# Patient Record
Sex: MALE | URBAN - METROPOLITAN AREA
[De-identification: names, ages, dates, MRNs, and addresses within clinical notes are randomized per-mention and may not be internally consistent; named-entity substitution may affect disease eponyms.]

---

## 2017-09-02 ENCOUNTER — INPATIENT (INPATIENT)
Facility: HOSPITAL | Age: 62
LOS: 2 days | Discharge: HOME | End: 2017-09-05
Attending: SURGERY | Admitting: SURGERY
Payer: COMMERCIAL

## 2017-09-02 ENCOUNTER — TRANSCRIPTION ENCOUNTER (OUTPATIENT)
Age: 62
End: 2017-09-02

## 2017-09-02 ENCOUNTER — RESULT REVIEW (OUTPATIENT)
Age: 62
End: 2017-09-02

## 2017-09-02 VITALS
DIASTOLIC BLOOD PRESSURE: 91 MMHG | TEMPERATURE: 98 F | RESPIRATION RATE: 18 BRPM | HEART RATE: 94 BPM | OXYGEN SATURATION: 100 % | SYSTOLIC BLOOD PRESSURE: 146 MMHG

## 2017-09-02 DIAGNOSIS — K35.80 UNSPECIFIED ACUTE APPENDICITIS: ICD-10-CM

## 2017-09-02 DIAGNOSIS — K37 UNSPECIFIED APPENDICITIS: ICD-10-CM

## 2017-09-02 LAB
ALBUMIN SERPL ELPH-MCNC: 4.1 G/DL — SIGNIFICANT CHANGE UP (ref 3.3–5)
ALP SERPL-CCNC: 83 U/L — SIGNIFICANT CHANGE UP (ref 40–120)
ALT FLD-CCNC: 13 U/L — SIGNIFICANT CHANGE UP (ref 4–41)
APPEARANCE UR: CLEAR — SIGNIFICANT CHANGE UP
APTT BLD: 25.9 SEC — LOW (ref 27.5–37.4)
AST SERPL-CCNC: 15 U/L — SIGNIFICANT CHANGE UP (ref 4–40)
BACTERIA # UR AUTO: SIGNIFICANT CHANGE UP
BASE EXCESS BLDV CALC-SCNC: 2.6 MMOL/L — SIGNIFICANT CHANGE UP
BASOPHILS # BLD AUTO: 0.07 K/UL — SIGNIFICANT CHANGE UP (ref 0–0.2)
BASOPHILS NFR BLD AUTO: 0.4 % — SIGNIFICANT CHANGE UP (ref 0–2)
BILIRUB SERPL-MCNC: 0.4 MG/DL — SIGNIFICANT CHANGE UP (ref 0.2–1.2)
BILIRUB UR-MCNC: NEGATIVE — SIGNIFICANT CHANGE UP
BLD GP AB SCN SERPL QL: NEGATIVE — SIGNIFICANT CHANGE UP
BLOOD GAS VENOUS - CREATININE: 1.04 MG/DL — SIGNIFICANT CHANGE UP (ref 0.5–1.3)
BLOOD UR QL VISUAL: HIGH
BUN SERPL-MCNC: 12 MG/DL — SIGNIFICANT CHANGE UP (ref 7–23)
CALCIUM SERPL-MCNC: 9.6 MG/DL — SIGNIFICANT CHANGE UP (ref 8.4–10.5)
CHLORIDE BLDV-SCNC: 107 MMOL/L — SIGNIFICANT CHANGE UP (ref 96–108)
CHLORIDE SERPL-SCNC: 102 MMOL/L — SIGNIFICANT CHANGE UP (ref 98–107)
CO2 SERPL-SCNC: 26 MMOL/L — SIGNIFICANT CHANGE UP (ref 22–31)
COLOR SPEC: YELLOW — SIGNIFICANT CHANGE UP
CREAT SERPL-MCNC: 1.05 MG/DL — SIGNIFICANT CHANGE UP (ref 0.5–1.3)
EOSINOPHIL # BLD AUTO: 0.18 K/UL — SIGNIFICANT CHANGE UP (ref 0–0.5)
EOSINOPHIL NFR BLD AUTO: 1 % — SIGNIFICANT CHANGE UP (ref 0–6)
GAS PNL BLDV: 137 MMOL/L — SIGNIFICANT CHANGE UP (ref 136–146)
GLUCOSE BLDV-MCNC: 180 — HIGH (ref 70–99)
GLUCOSE SERPL-MCNC: 171 MG/DL — HIGH (ref 70–99)
GLUCOSE UR-MCNC: 250 — SIGNIFICANT CHANGE UP
HBA1C BLD-MCNC: 6.9 % — HIGH (ref 4–5.6)
HCO3 BLDV-SCNC: 24 MMOL/L — SIGNIFICANT CHANGE UP (ref 20–27)
HCT VFR BLD CALC: 44.9 % — SIGNIFICANT CHANGE UP (ref 39–50)
HCT VFR BLDV CALC: 48.2 % — SIGNIFICANT CHANGE UP (ref 39–51)
HGB BLD-MCNC: 15.1 G/DL — SIGNIFICANT CHANGE UP (ref 13–17)
HGB BLDV-MCNC: 15.7 G/DL — SIGNIFICANT CHANGE UP (ref 13–17)
HYALINE CASTS # UR AUTO: SIGNIFICANT CHANGE UP (ref 0–?)
IMM GRANULOCYTES # BLD AUTO: 0.09 # — SIGNIFICANT CHANGE UP
IMM GRANULOCYTES NFR BLD AUTO: 0.5 % — SIGNIFICANT CHANGE UP (ref 0–1.5)
INR BLD: 0.91 — SIGNIFICANT CHANGE UP (ref 0.88–1.17)
KETONES UR-MCNC: NEGATIVE — SIGNIFICANT CHANGE UP
LACTATE BLDV-MCNC: 2.9 MMOL/L — HIGH (ref 0.5–2)
LEUKOCYTE ESTERASE UR-ACNC: NEGATIVE — SIGNIFICANT CHANGE UP
LIDOCAIN IGE QN: 63.3 U/L — HIGH (ref 7–60)
LYMPHOCYTES # BLD AUTO: 26.3 % — SIGNIFICANT CHANGE UP (ref 13–44)
LYMPHOCYTES # BLD AUTO: 4.55 K/UL — HIGH (ref 1–3.3)
MCHC RBC-ENTMCNC: 32.3 PG — SIGNIFICANT CHANGE UP (ref 27–34)
MCHC RBC-ENTMCNC: 33.6 % — SIGNIFICANT CHANGE UP (ref 32–36)
MCV RBC AUTO: 96.1 FL — SIGNIFICANT CHANGE UP (ref 80–100)
MONOCYTES # BLD AUTO: 0.79 K/UL — SIGNIFICANT CHANGE UP (ref 0–0.9)
MONOCYTES NFR BLD AUTO: 4.6 % — SIGNIFICANT CHANGE UP (ref 2–14)
MUCOUS THREADS # UR AUTO: SIGNIFICANT CHANGE UP
NEUTROPHILS # BLD AUTO: 11.64 K/UL — HIGH (ref 1.8–7.4)
NEUTROPHILS NFR BLD AUTO: 67.2 % — SIGNIFICANT CHANGE UP (ref 43–77)
NITRITE UR-MCNC: NEGATIVE — SIGNIFICANT CHANGE UP
NRBC # FLD: 0 — SIGNIFICANT CHANGE UP
PCO2 BLDV: 56 MMHG — HIGH (ref 41–51)
PH BLDV: 7.32 PH — SIGNIFICANT CHANGE UP (ref 7.32–7.43)
PH UR: 6 — SIGNIFICANT CHANGE UP (ref 4.6–8)
PLATELET # BLD AUTO: 256 K/UL — SIGNIFICANT CHANGE UP (ref 150–400)
PMV BLD: 9.9 FL — SIGNIFICANT CHANGE UP (ref 7–13)
PO2 BLDV: 31 MMHG — LOW (ref 35–40)
POTASSIUM BLDV-SCNC: 3.4 MMOL/L — SIGNIFICANT CHANGE UP (ref 3.4–4.5)
POTASSIUM SERPL-MCNC: 3.6 MMOL/L — SIGNIFICANT CHANGE UP (ref 3.5–5.3)
POTASSIUM SERPL-SCNC: 3.6 MMOL/L — SIGNIFICANT CHANGE UP (ref 3.5–5.3)
PROT SERPL-MCNC: 7 G/DL — SIGNIFICANT CHANGE UP (ref 6–8.3)
PROT UR-MCNC: 10 — SIGNIFICANT CHANGE UP
PROTHROM AB SERPL-ACNC: 10.2 SEC — SIGNIFICANT CHANGE UP (ref 9.8–13.1)
RBC # BLD: 4.67 M/UL — SIGNIFICANT CHANGE UP (ref 4.2–5.8)
RBC # FLD: 12.7 % — SIGNIFICANT CHANGE UP (ref 10.3–14.5)
RBC CASTS # UR COMP ASSIST: HIGH (ref 0–?)
RH IG SCN BLD-IMP: POSITIVE — SIGNIFICANT CHANGE UP
SAO2 % BLDV: 48.7 % — LOW (ref 60–85)
SODIUM SERPL-SCNC: 141 MMOL/L — SIGNIFICANT CHANGE UP (ref 135–145)
SP GR SPEC: 1.02 — SIGNIFICANT CHANGE UP (ref 1–1.03)
SQUAMOUS # UR AUTO: SIGNIFICANT CHANGE UP
UROBILINOGEN FLD QL: NORMAL E.U. — SIGNIFICANT CHANGE UP (ref 0.1–0.2)
WBC # BLD: 17.32 K/UL — HIGH (ref 3.8–10.5)
WBC # FLD AUTO: 17.32 K/UL — HIGH (ref 3.8–10.5)
WBC UR QL: SIGNIFICANT CHANGE UP (ref 0–?)

## 2017-09-02 PROCEDURE — 44970 LAPAROSCOPY APPENDECTOMY: CPT | Mod: GC

## 2017-09-02 PROCEDURE — 88304 TISSUE EXAM BY PATHOLOGIST: CPT | Mod: 26

## 2017-09-02 RX ORDER — KETOROLAC TROMETHAMINE 30 MG/ML
30 SYRINGE (ML) INJECTION EVERY 6 HOURS
Qty: 0 | Refills: 0 | Status: DISCONTINUED | OUTPATIENT
Start: 2017-09-02 | End: 2017-09-05

## 2017-09-02 RX ORDER — SODIUM CHLORIDE 9 MG/ML
1000 INJECTION INTRAMUSCULAR; INTRAVENOUS; SUBCUTANEOUS ONCE
Qty: 0 | Refills: 0 | Status: COMPLETED | OUTPATIENT
Start: 2017-09-02 | End: 2017-09-02

## 2017-09-02 RX ORDER — HYDROMORPHONE HYDROCHLORIDE 2 MG/ML
0.25 INJECTION INTRAMUSCULAR; INTRAVENOUS; SUBCUTANEOUS
Qty: 0 | Refills: 0 | Status: DISCONTINUED | OUTPATIENT
Start: 2017-09-02 | End: 2017-09-02

## 2017-09-02 RX ORDER — PIPERACILLIN AND TAZOBACTAM 4; .5 G/20ML; G/20ML
3.38 INJECTION, POWDER, LYOPHILIZED, FOR SOLUTION INTRAVENOUS ONCE
Qty: 0 | Refills: 0 | Status: COMPLETED | OUTPATIENT
Start: 2017-09-02 | End: 2017-09-02

## 2017-09-02 RX ORDER — HYDROMORPHONE HYDROCHLORIDE 2 MG/ML
1 INJECTION INTRAMUSCULAR; INTRAVENOUS; SUBCUTANEOUS ONCE
Qty: 0 | Refills: 0 | Status: DISCONTINUED | OUTPATIENT
Start: 2017-09-02 | End: 2017-09-02

## 2017-09-02 RX ORDER — PIPERACILLIN AND TAZOBACTAM 4; .5 G/20ML; G/20ML
3.38 INJECTION, POWDER, LYOPHILIZED, FOR SOLUTION INTRAVENOUS EVERY 8 HOURS
Qty: 0 | Refills: 0 | Status: DISCONTINUED | OUTPATIENT
Start: 2017-09-02 | End: 2017-09-04

## 2017-09-02 RX ORDER — ACETAMINOPHEN 500 MG
1000 TABLET ORAL ONCE
Qty: 0 | Refills: 0 | Status: COMPLETED | OUTPATIENT
Start: 2017-09-02 | End: 2017-09-03

## 2017-09-02 RX ORDER — KETOROLAC TROMETHAMINE 30 MG/ML
30 SYRINGE (ML) INJECTION ONCE
Qty: 0 | Refills: 0 | Status: DISCONTINUED | OUTPATIENT
Start: 2017-09-02 | End: 2017-09-02

## 2017-09-02 RX ORDER — HYDROMORPHONE HYDROCHLORIDE 2 MG/ML
0.5 INJECTION INTRAMUSCULAR; INTRAVENOUS; SUBCUTANEOUS
Qty: 0 | Refills: 0 | Status: DISCONTINUED | OUTPATIENT
Start: 2017-09-02 | End: 2017-09-02

## 2017-09-02 RX ORDER — HEPARIN SODIUM 5000 [USP'U]/ML
5000 INJECTION INTRAVENOUS; SUBCUTANEOUS EVERY 8 HOURS
Qty: 0 | Refills: 0 | Status: DISCONTINUED | OUTPATIENT
Start: 2017-09-02 | End: 2017-09-05

## 2017-09-02 RX ORDER — OXYCODONE HYDROCHLORIDE 5 MG/1
5 TABLET ORAL EVERY 6 HOURS
Qty: 0 | Refills: 0 | Status: DISCONTINUED | OUTPATIENT
Start: 2017-09-02 | End: 2017-09-02

## 2017-09-02 RX ORDER — ACETAMINOPHEN 500 MG
1000 TABLET ORAL ONCE
Qty: 0 | Refills: 0 | Status: COMPLETED | OUTPATIENT
Start: 2017-09-02 | End: 2017-09-02

## 2017-09-02 RX ORDER — NICOTINE POLACRILEX 2 MG
1 GUM BUCCAL DAILY
Qty: 0 | Refills: 0 | Status: DISCONTINUED | OUTPATIENT
Start: 2017-09-02 | End: 2017-09-03

## 2017-09-02 RX ORDER — SODIUM CHLORIDE 9 MG/ML
1000 INJECTION, SOLUTION INTRAVENOUS
Qty: 0 | Refills: 0 | Status: DISCONTINUED | OUTPATIENT
Start: 2017-09-02 | End: 2017-09-04

## 2017-09-02 RX ORDER — ONDANSETRON 8 MG/1
4 TABLET, FILM COATED ORAL ONCE
Qty: 0 | Refills: 0 | Status: DISCONTINUED | OUTPATIENT
Start: 2017-09-02 | End: 2017-09-02

## 2017-09-02 RX ORDER — OXYCODONE HYDROCHLORIDE 5 MG/1
2.5 TABLET ORAL ONCE
Qty: 0 | Refills: 0 | Status: DISCONTINUED | OUTPATIENT
Start: 2017-09-02 | End: 2017-09-02

## 2017-09-02 RX ADMIN — HEPARIN SODIUM 5000 UNIT(S): 5000 INJECTION INTRAVENOUS; SUBCUTANEOUS at 06:44

## 2017-09-02 RX ADMIN — SODIUM CHLORIDE 125 MILLILITER(S): 9 INJECTION, SOLUTION INTRAVENOUS at 23:12

## 2017-09-02 RX ADMIN — SODIUM CHLORIDE 125 MILLILITER(S): 9 INJECTION, SOLUTION INTRAVENOUS at 11:00

## 2017-09-02 RX ADMIN — HYDROMORPHONE HYDROCHLORIDE 0.5 MILLIGRAM(S): 2 INJECTION INTRAMUSCULAR; INTRAVENOUS; SUBCUTANEOUS at 11:45

## 2017-09-02 RX ADMIN — Medication 400 MILLIGRAM(S): at 02:52

## 2017-09-02 RX ADMIN — Medication 1000 MILLIGRAM(S): at 03:11

## 2017-09-02 RX ADMIN — HEPARIN SODIUM 5000 UNIT(S): 5000 INJECTION INTRAVENOUS; SUBCUTANEOUS at 13:40

## 2017-09-02 RX ADMIN — HEPARIN SODIUM 5000 UNIT(S): 5000 INJECTION INTRAVENOUS; SUBCUTANEOUS at 23:14

## 2017-09-02 RX ADMIN — PIPERACILLIN AND TAZOBACTAM 200 GRAM(S): 4; .5 INJECTION, POWDER, LYOPHILIZED, FOR SOLUTION INTRAVENOUS at 06:22

## 2017-09-02 RX ADMIN — HYDROMORPHONE HYDROCHLORIDE 1 MILLIGRAM(S): 2 INJECTION INTRAMUSCULAR; INTRAVENOUS; SUBCUTANEOUS at 02:26

## 2017-09-02 RX ADMIN — SODIUM CHLORIDE 125 MILLILITER(S): 9 INJECTION, SOLUTION INTRAVENOUS at 06:46

## 2017-09-02 RX ADMIN — HYDROMORPHONE HYDROCHLORIDE 0.5 MILLIGRAM(S): 2 INJECTION INTRAMUSCULAR; INTRAVENOUS; SUBCUTANEOUS at 16:15

## 2017-09-02 RX ADMIN — SODIUM CHLORIDE 1000 MILLILITER(S): 9 INJECTION INTRAMUSCULAR; INTRAVENOUS; SUBCUTANEOUS at 02:52

## 2017-09-02 RX ADMIN — HYDROMORPHONE HYDROCHLORIDE 0.5 MILLIGRAM(S): 2 INJECTION INTRAMUSCULAR; INTRAVENOUS; SUBCUTANEOUS at 11:32

## 2017-09-02 RX ADMIN — HYDROMORPHONE HYDROCHLORIDE 1 MILLIGRAM(S): 2 INJECTION INTRAMUSCULAR; INTRAVENOUS; SUBCUTANEOUS at 02:30

## 2017-09-02 RX ADMIN — PIPERACILLIN AND TAZOBACTAM 25 GRAM(S): 4; .5 INJECTION, POWDER, LYOPHILIZED, FOR SOLUTION INTRAVENOUS at 23:10

## 2017-09-02 RX ADMIN — Medication 30 MILLIGRAM(S): at 05:17

## 2017-09-02 RX ADMIN — PIPERACILLIN AND TAZOBACTAM 25 GRAM(S): 4; .5 INJECTION, POWDER, LYOPHILIZED, FOR SOLUTION INTRAVENOUS at 13:40

## 2017-09-02 RX ADMIN — Medication 30 MILLIGRAM(S): at 19:04

## 2017-09-02 RX ADMIN — Medication 30 MILLIGRAM(S): at 06:22

## 2017-09-02 RX ADMIN — Medication 30 MILLIGRAM(S): at 18:14

## 2017-09-02 RX ADMIN — HYDROMORPHONE HYDROCHLORIDE 0.5 MILLIGRAM(S): 2 INJECTION INTRAMUSCULAR; INTRAVENOUS; SUBCUTANEOUS at 16:05

## 2017-09-02 RX ADMIN — SODIUM CHLORIDE 1000 MILLILITER(S): 9 INJECTION INTRAMUSCULAR; INTRAVENOUS; SUBCUTANEOUS at 05:06

## 2017-09-02 NOTE — ED ADULT NURSE NOTE - CHPI ED SYMPTOMS NEG
no hematuria/no abdominal distension/no diarrhea/no dysuria/no blood in stool/no chills/no burning urination/no fever/no vomiting/no nausea

## 2017-09-02 NOTE — ED ADULT NURSE REASSESSMENT NOTE - NS ED NURSE REASSESS COMMENT FT1
pt refused to have belongings itemized and sent to security, when pt went to the OR it was explained to the pt that his belongings need to be locked up with security, and can not go to the OR,  when PCA brought belongings to security, Security refused to take pts belongings stating they were not in the proper bags. Belongings itemized by RN and PCA Jeannette. No wallet or passport was found when itemizing items. IPAD, Lap top, car keys and cellphone are with briefcase and suit case. See itemized list.   Attempted to call daughter at number given for emergency contact, number busy. Items kept at nursing station.

## 2017-09-02 NOTE — ED ADULT TRIAGE NOTE - CHIEF COMPLAINT QUOTE
Pt. brought in by EMS c/o bilateral lower abdominal pain x 6 hours. Reports he was about to board a flight to [place] but that pain began and has progressively worsened, EMS called and patient brought to [facility] from [facility] airport. Also reports urinary retention since 7pm. Denies n/v/d/c, fevers or chills. Appears uncomfortable.

## 2017-09-02 NOTE — ED PROVIDER NOTE - ATTENDING CONTRIBUTION TO CARE
MD Benedict:  patient seen and evaluated with the resident.  I was present for key portions of the History & Physical, and I agree with the Impression & Plan.  MD Benedict:  63 yo M, c/o severe abd pain.  Intensity:  10/10.  Quality:  sharp/stabbing.  Location: R-sided.  Better/worse - n/a.  Duration:  intermittent 2d.  Context:  Was boarding a flight to [place] when his pain intensified to the point where he sought medical attention.  On exam he is colicky-appearing, +flank pain, +abd pain.  Impression:  renal colic vs bowel pathology.  XRay= no free air.  Ua - unable to make urine.  Plan:  CT abd w/o contrast, pain meds, IVF.

## 2017-09-02 NOTE — H&P ADULT - ATTENDING COMMENTS
I have personally interviewed and examined this patient, reviewed pertinent labs and imaging, and discussed the case with residents on B Team rounds.    The active care issues are:  1. acute appendicitis requiring urgent surgical intervention given severity of infection    We are aware of the patient's recent cocaine use and will use appropriate precautions for cardiac events.  Unfortunately, the severity of infection prohibits waiting.  The patient, anesthesiologist are aware and agree.

## 2017-09-02 NOTE — ED PROVIDER NOTE - OBJECTIVE STATEMENT
63yo male pmh pre-DM, HTN p/w bilateral lower abdominal pain radiating to groin, intermittent now constant x2d. Also c/o bilateral lower back pain. Unable to urinate x 6h. Denies f/c/n/v/d, chest pain, dyspnea, trauma. Sister with nephrolithiasis. Pt has idiopathic hematuria.

## 2017-09-02 NOTE — ED ADULT NURSE NOTE - CHIEF COMPLAINT QUOTE
Pt. brought in by EMS c/o bilateral lower abdominal pain x 6 hours. Reports he was about to board a flight to [place] but that pain began and has progressively worsened, EMS called and patient brought to [facility] from [facility] airport. Also reports urinary retention since 7pm. Denies n/v/d/c, fevers or chills. Appears uncomfortable.

## 2017-09-02 NOTE — H&P ADULT - NSHPLABSRESULTS_GEN_ALL_CORE
Labs:                        15.1   17.32 )-----------( 256      ( 02 Sep 2017 02:30 )             44.9     09-02    141  |  102  |  12  ----------------------------<  171<H>  3.6   |  26  |  1.05    Ca    9.6      02 Sep 2017 02:30    TPro  7.0  /  Alb  4.1  /  TBili  0.4  /  DBili  x   /  AST  15  /  ALT  13  /  AlkPhos  83  09-02    PT/INR - ( 02 Sep 2017 02:30 )   PT: 10.2 SEC;   INR: 0.91          PTT - ( 02 Sep 2017 02:30 )  PTT:25.9 SEC      Blood Gas Venous - Lactate: 2.9: Please note updated reference range. mmol/L (09.02.17 @ 03:03)      Imaging  < from: CT Abdomen and Pelvis No Cont (09.02.17 @ 04:52) >    FINDINGS: Evaluation of the heart, vascular structures, and   intra-abdominal organs is limited without the administration of IV   contrast. The heart size is mildly enlarged. Aortic valvular   calcifications are noted. There is mild atherosclerotic disease of the   descending aorta and branch arterial vessels. The imaged portions of the   aorta are normal in caliber.    Scattered areas of dependent atelectasis are notable within the bilateral   lung bases.    There is a rounded hypodense focus within the right medial hepatic lobe   which is too small to accurately characterize. The gallbladder, biliary   tree, spleen, pancreas, and adrenal glands appear unremarkable on this   noncontrast examination.    There is nonspecific bilateral perinephric stranding. There is no   hydroureteronephrosis bilaterally. No radiopaque obstructing stones are   noted within the course of the bilateral ureters. The urinary bladder   appears unremarkable.    There are multiple scattered nonspecific subcentimeter retroperitoneal   and mesenteric lymph nodes.    There is no bowel obstruction. Gas and stool are notable throughout the   large bowel loops. Colonic diverticulosis is notable. There is wall   thickening involving the appendix, most pronounced at the base.   Intraluminal appendicoliths are noted. There is surrounding fat stranding.    There is a small amount of ascites.    Calcifications are notable within the prostate gland.    The visualized osseous structures are unremarkable.     IMPRESSION: Findings suspicious for appendicitis.    Small amount of abdominal ascites.    No obstructive urolithiasis.    Dr. Mario Luna discussed findings with Dr. Hoff on 9/2/2017 at 5:26 AM    < end of copied text >

## 2017-09-02 NOTE — ED ADULT NURSE NOTE - OBJECTIVE STATEMENT
Pt 62y male, aaox4 and ambulatory, presents to ED c/o severe abd pain. Pt was at [place] airport ready to board plane to [place], when he started to have severe abd pain. As per pt he had a normal BM earlier today, but has been unable to pee since 7pm. Pt state pain started higher in abd but now pain appears to be lower in the abd. Pt appears uncomfortable, unable to lay still. Daughter at bedside. Pt denies any significant pmh. Pt denies n/v/d, ha, fever, chills, cp, sob. IV placed 20G left AC, labs drawn and sent, will continue to monitor.

## 2017-09-02 NOTE — H&P ADULT - NSHPPHYSICALEXAM_GEN_ALL_CORE
T(C): 36.6 (09-02-17 @ 01:46), Max: 36.6 (09-02-17 @ 01:46)  HR: 104 (09-02-17 @ 03:35) (94 - 104)  BP: 126/90 (09-02-17 @ 03:35) (126/90 - 146/91)  RR: 14 (09-02-17 @ 03:35) (14 - 18)  SpO2: 100% (09-02-17 @ 03:35) (100% - 100%)  Tmax: T(C): , Max: 36.6 (09-02-17 @ 01:46)    Gen: NAD  HEENT: normocephalic, atraumatic, no scleral icterus  CV: S1, S2, RRR  Pulm: CTA B/L  Abd: soft, mildly distended, diffusely tender especially RLQ, +guarding/rebound RLQ and LUQ  Ext: warm, no edema, palp dp/pt

## 2017-09-02 NOTE — H&P ADULT - HISTORY OF PRESENT ILLNESS
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 He reports some abdominal discomfort/bloating over the last 2 days, and acute onset of generalized pain at midnight 9/2/17 while preparing to board a flight to [place] from [facility]. He was brought to [facility] by ambulance. He denies associated fevers/chills, nausea/vomiting, dysuria. He has been having flatus and normal bowel movements without melena or hematochezia. Last colonoscopy in 2013 was reportedly normal. He had a stress test in 2010 for angina that reportedly showed some coronary lesions but did not required any revascularization procedures.    We are consulted by the ED for concerns of generalized peritonitis on exam and CT findings c/w appendicitis.    He received IV Zosyn x 1 in the ED, 2L NS, IV toradol, IV dilaudid and IV tylenol.

## 2017-09-02 NOTE — ED PROVIDER NOTE - PROGRESS NOTE DETAILS
No renal stone on CT; but is concerning for appendicitis.  abd pain more diffusely peritoneal.  Impression:  Acute appendicitis.  Plan:  abx, ivf, pre-ops, admit.

## 2017-09-02 NOTE — ED PROVIDER NOTE - MEDICAL DECISION MAKING DETAILS
61yo male p/w severe lower abdominal pain. STAT portable xray shows no free air. Concern for nephrolithiasis. Pain control, labs, CT abd/pelvis

## 2017-09-02 NOTE — H&P ADULT - NSHPSOCIALHISTORY_GEN_ALL_CORE
, lives alone, has 3 grown daughters. 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 Current smoker, 40 pack year hx. Social ETOH use. , lives alone, has 3 grown daughters. 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 Current smoker, 40 pack year hx. Social ETOH use. +cocaine use (last use yesterday)

## 2017-09-03 LAB
BACTERIA UR CULT: SIGNIFICANT CHANGE UP
BUN SERPL-MCNC: 12 MG/DL — SIGNIFICANT CHANGE UP (ref 7–23)
CALCIUM SERPL-MCNC: 9 MG/DL — SIGNIFICANT CHANGE UP (ref 8.4–10.5)
CHLORIDE SERPL-SCNC: 104 MMOL/L — SIGNIFICANT CHANGE UP (ref 98–107)
CO2 SERPL-SCNC: 22 MMOL/L — SIGNIFICANT CHANGE UP (ref 22–31)
CREAT SERPL-MCNC: 0.83 MG/DL — SIGNIFICANT CHANGE UP (ref 0.5–1.3)
GLUCOSE SERPL-MCNC: 143 MG/DL — HIGH (ref 70–99)
HCT VFR BLD CALC: 37.4 % — LOW (ref 39–50)
HGB BLD-MCNC: 12.7 G/DL — LOW (ref 13–17)
MCHC RBC-ENTMCNC: 32.2 PG — SIGNIFICANT CHANGE UP (ref 27–34)
MCHC RBC-ENTMCNC: 34 % — SIGNIFICANT CHANGE UP (ref 32–36)
MCV RBC AUTO: 94.9 FL — SIGNIFICANT CHANGE UP (ref 80–100)
NRBC # FLD: 0 — SIGNIFICANT CHANGE UP
PLATELET # BLD AUTO: 199 K/UL — SIGNIFICANT CHANGE UP (ref 150–400)
PMV BLD: 10 FL — SIGNIFICANT CHANGE UP (ref 7–13)
POTASSIUM SERPL-MCNC: 3.7 MMOL/L — SIGNIFICANT CHANGE UP (ref 3.5–5.3)
POTASSIUM SERPL-SCNC: 3.7 MMOL/L — SIGNIFICANT CHANGE UP (ref 3.5–5.3)
RBC # BLD: 3.94 M/UL — LOW (ref 4.2–5.8)
RBC # FLD: 12.8 % — SIGNIFICANT CHANGE UP (ref 10.3–14.5)
SODIUM SERPL-SCNC: 141 MMOL/L — SIGNIFICANT CHANGE UP (ref 135–145)
SPECIMEN SOURCE: SIGNIFICANT CHANGE UP
WBC # BLD: 15.1 K/UL — HIGH (ref 3.8–10.5)
WBC # FLD AUTO: 15.1 K/UL — HIGH (ref 3.8–10.5)

## 2017-09-03 RX ORDER — ACETAMINOPHEN 500 MG
1000 TABLET ORAL ONCE
Qty: 0 | Refills: 0 | Status: DISCONTINUED | OUTPATIENT
Start: 2017-09-03 | End: 2017-09-04

## 2017-09-03 RX ORDER — MORPHINE SULFATE 50 MG/1
2 CAPSULE, EXTENDED RELEASE ORAL ONCE
Qty: 0 | Refills: 0 | Status: DISCONTINUED | OUTPATIENT
Start: 2017-09-03 | End: 2017-09-03

## 2017-09-03 RX ORDER — ACETAMINOPHEN 500 MG
650 TABLET ORAL EVERY 6 HOURS
Qty: 0 | Refills: 0 | Status: DISCONTINUED | OUTPATIENT
Start: 2017-09-03 | End: 2017-09-03

## 2017-09-03 RX ORDER — ZOLPIDEM TARTRATE 10 MG/1
5 TABLET ORAL AT BEDTIME
Qty: 0 | Refills: 0 | Status: DISCONTINUED | OUTPATIENT
Start: 2017-09-03 | End: 2017-09-04

## 2017-09-03 RX ORDER — ACETAMINOPHEN 500 MG
1000 TABLET ORAL ONCE
Qty: 0 | Refills: 0 | Status: DISCONTINUED | OUTPATIENT
Start: 2017-09-03 | End: 2017-09-03

## 2017-09-03 RX ORDER — MORPHINE SULFATE 50 MG/1
2 CAPSULE, EXTENDED RELEASE ORAL EVERY 4 HOURS
Qty: 0 | Refills: 0 | Status: DISCONTINUED | OUTPATIENT
Start: 2017-09-03 | End: 2017-09-05

## 2017-09-03 RX ORDER — NICOTINE POLACRILEX 2 MG
1 GUM BUCCAL DAILY
Qty: 0 | Refills: 0 | Status: DISCONTINUED | OUTPATIENT
Start: 2017-09-03 | End: 2017-09-05

## 2017-09-03 RX ADMIN — Medication 30 MILLIGRAM(S): at 00:33

## 2017-09-03 RX ADMIN — MORPHINE SULFATE 2 MILLIGRAM(S): 50 CAPSULE, EXTENDED RELEASE ORAL at 18:13

## 2017-09-03 RX ADMIN — Medication 1000 MILLIGRAM(S): at 09:03

## 2017-09-03 RX ADMIN — PIPERACILLIN AND TAZOBACTAM 25 GRAM(S): 4; .5 INJECTION, POWDER, LYOPHILIZED, FOR SOLUTION INTRAVENOUS at 13:49

## 2017-09-03 RX ADMIN — Medication 30 MILLIGRAM(S): at 17:07

## 2017-09-03 RX ADMIN — MORPHINE SULFATE 2 MILLIGRAM(S): 50 CAPSULE, EXTENDED RELEASE ORAL at 17:58

## 2017-09-03 RX ADMIN — Medication 30 MILLIGRAM(S): at 17:22

## 2017-09-03 RX ADMIN — ZOLPIDEM TARTRATE 5 MILLIGRAM(S): 10 TABLET ORAL at 23:33

## 2017-09-03 RX ADMIN — MORPHINE SULFATE 2 MILLIGRAM(S): 50 CAPSULE, EXTENDED RELEASE ORAL at 21:07

## 2017-09-03 RX ADMIN — Medication 1 PATCH: at 12:13

## 2017-09-03 RX ADMIN — Medication 30 MILLIGRAM(S): at 23:32

## 2017-09-03 RX ADMIN — Medication 30 MILLIGRAM(S): at 00:50

## 2017-09-03 RX ADMIN — MORPHINE SULFATE 2 MILLIGRAM(S): 50 CAPSULE, EXTENDED RELEASE ORAL at 21:40

## 2017-09-03 RX ADMIN — Medication 30 MILLIGRAM(S): at 12:21

## 2017-09-03 RX ADMIN — PIPERACILLIN AND TAZOBACTAM 25 GRAM(S): 4; .5 INJECTION, POWDER, LYOPHILIZED, FOR SOLUTION INTRAVENOUS at 06:40

## 2017-09-03 RX ADMIN — Medication 30 MILLIGRAM(S): at 06:55

## 2017-09-03 RX ADMIN — HEPARIN SODIUM 5000 UNIT(S): 5000 INJECTION INTRAVENOUS; SUBCUTANEOUS at 06:37

## 2017-09-03 RX ADMIN — PIPERACILLIN AND TAZOBACTAM 25 GRAM(S): 4; .5 INJECTION, POWDER, LYOPHILIZED, FOR SOLUTION INTRAVENOUS at 21:23

## 2017-09-03 RX ADMIN — SODIUM CHLORIDE 125 MILLILITER(S): 9 INJECTION, SOLUTION INTRAVENOUS at 08:38

## 2017-09-03 RX ADMIN — Medication 30 MILLIGRAM(S): at 06:36

## 2017-09-03 RX ADMIN — Medication 400 MILLIGRAM(S): at 08:48

## 2017-09-03 RX ADMIN — Medication 30 MILLIGRAM(S): at 12:06

## 2017-09-03 NOTE — PROGRESS NOTE ADULT - ASSESSMENT
62y M POD 0 s/p lap appendectomy for ruptured appendicitis, recovering well.  -AVSS  -Pain well controlled

## 2017-09-03 NOTE — PROGRESS NOTE ADULT - SUBJECTIVE AND OBJECTIVE BOX
Surgery B Team Post Op Check    Subjective:  Patient examined at bedside resting well, complains of mild tenderness to abdomen at surgical incision site but states he feels " a world of difference, better" post-op vs preop.  No complaints at this time.    Denies n/v, sob,     Objective:  Vital Signs Last 24 Hrs  T(C): 36.7 (03 Sep 2017 05:13), Max: 36.9 (02 Sep 2017 19:56)  T(F): 98.1 (03 Sep 2017 05:13), Max: 98.5 (02 Sep 2017 19:56)  HR: 90 (03 Sep 2017 05:13) (88 - 109)  BP: 112/71 (03 Sep 2017 05:13) (112/71 - 154/85)  BP(mean): --  RR: 18 (03 Sep 2017 05:13) (11 - 20)  SpO2: 97% (03 Sep 2017 05:13) (95% - 100%)    Labs:                        15.1   17.32 )-----------( 256      ( 02 Sep 2017 02:30 )             44.9       09-02    141  |  102  |  12  ----------------------------<  171<H>  3.6   |  26  |  1.05    Ca    9.6      02 Sep 2017 02:30    TPro  7.0  /  Alb  4.1  /  TBili  0.4  /  DBili  x   /  AST  15  /  ALT  13  /  AlkPhos  83  09-02      I&O's Detail    02 Sep 2017 07:01  -  03 Sep 2017 06:18  --------------------------------------------------------  IN:    lactated ringers.: 1375 mL  Total IN: 1375 mL    OUT:    Indwelling Catheter - Urethral: 2750 mL  Total OUT: 2750 mL    Total NET: -1375 mL    Focused Physical Exam:  General: NAD  Respiratory: Nonlabored breathing  Abdomen: Soft, tender to palpation diffusely (appropriate) due to incision sites, ND

## 2017-09-03 NOTE — PROGRESS NOTE ADULT - SUBJECTIVE AND OBJECTIVE BOX
SUBJECTIVE:  Doing well.   No overnight events.    OBJECTIVE:     ** VITAL SIGNS / I&O's **    Vital Signs Last 24 Hrs  T(C): 36.5 (03 Sep 2017 12:15), Max: 36.9 (02 Sep 2017 19:56)  T(F): 97.7 (03 Sep 2017 12:15), Max: 98.5 (02 Sep 2017 19:56)  HR: 90 (03 Sep 2017 12:15) (88 - 105)  BP: 132/78 (03 Sep 2017 12:15) (112/71 - 141/90)  BP(mean): --  RR: 18 (03 Sep 2017 12:15) (12 - 18)  SpO2: 953% (03 Sep 2017 12:15) (95% - 953%)      02 Sep 2017 07:01  -  03 Sep 2017 07:00  --------------------------------------------------------  IN:    lactated ringers.: 2125 mL  Total IN: 2125 mL    OUT:    Indwelling Catheter - Urethral: 2750 mL  Total OUT: 2750 mL    Total NET: -625 mL      03 Sep 2017 07:01  -  03 Sep 2017 12:25  --------------------------------------------------------  IN:    IV PiggyBack: 100 mL    lactated ringers.: 500 mL  Total IN: 600 mL    OUT:    Indwelling Catheter - Urethral: 225 mL  Total OUT: 225 mL    Total NET: 375 mL          ** PHYSICAL EXAM **    -- CONSTITUTIONAL: AOx3. NAD.   -- ABDOMEN: tender to palpation with peritoneal signs and distension, soft        ** LABS **                          12.7   15.10 )-----------( 199      ( 03 Sep 2017 05:54 )             37.4     03 Sep 2017 05:54    141    |  104    |  12     ----------------------------<  143    3.7     |  22     |  0.83     Ca    9.0        03 Sep 2017 05:54    TPro  7.0    /  Alb  4.1    /  TBili  0.4    /  DBili  x      /  AST  15     /  ALT  13     /  AlkPhos  83     02 Sep 2017 02:30    PT/INR - ( 02 Sep 2017 02:30 )   PT: 10.2 SEC;   INR: 0.91          PTT - ( 02 Sep 2017 02:30 )  PTT:25.9 SEC  CAPILLARY BLOOD GLUCOSE                A/P: POD#1      s/p  laparoscopic appendectomy, found to have ruptured appendicitis  -c/w parenteral antibiotics  -advance diet to clear liquid  - c/w IVF  -nicotine patch  - Pain control  - IS  - Ambulate  - DVT prophylaxis

## 2017-09-04 LAB
HCT VFR BLD CALC: 39.1 % — SIGNIFICANT CHANGE UP (ref 39–50)
HGB BLD-MCNC: 13.2 G/DL — SIGNIFICANT CHANGE UP (ref 13–17)
MCHC RBC-ENTMCNC: 32.6 PG — SIGNIFICANT CHANGE UP (ref 27–34)
MCHC RBC-ENTMCNC: 33.8 % — SIGNIFICANT CHANGE UP (ref 32–36)
MCV RBC AUTO: 96.5 FL — SIGNIFICANT CHANGE UP (ref 80–100)
NRBC # FLD: 0 — SIGNIFICANT CHANGE UP
PLATELET # BLD AUTO: 189 K/UL — SIGNIFICANT CHANGE UP (ref 150–400)
PMV BLD: 9.8 FL — SIGNIFICANT CHANGE UP (ref 7–13)
RBC # BLD: 4.05 M/UL — LOW (ref 4.2–5.8)
RBC # FLD: 12.8 % — SIGNIFICANT CHANGE UP (ref 10.3–14.5)
WBC # BLD: 11.5 K/UL — HIGH (ref 3.8–10.5)
WBC # FLD AUTO: 11.5 K/UL — HIGH (ref 3.8–10.5)

## 2017-09-04 RX ORDER — ZOLPIDEM TARTRATE 10 MG/1
5 TABLET ORAL AT BEDTIME
Qty: 0 | Refills: 0 | Status: DISCONTINUED | OUTPATIENT
Start: 2017-09-04 | End: 2017-09-05

## 2017-09-04 RX ORDER — ZOLPIDEM TARTRATE 10 MG/1
5 TABLET ORAL AT BEDTIME
Qty: 0 | Refills: 0 | Status: DISCONTINUED | OUTPATIENT
Start: 2017-09-04 | End: 2017-09-04

## 2017-09-04 RX ORDER — ACETAMINOPHEN 500 MG
650 TABLET ORAL EVERY 6 HOURS
Qty: 0 | Refills: 0 | Status: DISCONTINUED | OUTPATIENT
Start: 2017-09-04 | End: 2017-09-05

## 2017-09-04 RX ORDER — SODIUM CHLORIDE 9 MG/ML
3 INJECTION INTRAMUSCULAR; INTRAVENOUS; SUBCUTANEOUS EVERY 8 HOURS
Qty: 0 | Refills: 0 | Status: DISCONTINUED | OUTPATIENT
Start: 2017-09-04 | End: 2017-09-05

## 2017-09-04 RX ADMIN — Medication 30 MILLIGRAM(S): at 00:00

## 2017-09-04 RX ADMIN — ZOLPIDEM TARTRATE 5 MILLIGRAM(S): 10 TABLET ORAL at 02:46

## 2017-09-04 RX ADMIN — PIPERACILLIN AND TAZOBACTAM 25 GRAM(S): 4; .5 INJECTION, POWDER, LYOPHILIZED, FOR SOLUTION INTRAVENOUS at 06:50

## 2017-09-04 RX ADMIN — Medication 30 MILLIGRAM(S): at 06:49

## 2017-09-04 RX ADMIN — Medication 1 TABLET(S): at 17:25

## 2017-09-04 RX ADMIN — Medication 30 MILLIGRAM(S): at 11:58

## 2017-09-04 RX ADMIN — SODIUM CHLORIDE 3 MILLILITER(S): 9 INJECTION INTRAMUSCULAR; INTRAVENOUS; SUBCUTANEOUS at 22:06

## 2017-09-04 RX ADMIN — Medication 30 MILLIGRAM(S): at 07:27

## 2017-09-04 RX ADMIN — Medication 30 MILLIGRAM(S): at 11:43

## 2017-09-04 RX ADMIN — Medication 1 PATCH: at 07:35

## 2017-09-04 NOTE — PROGRESS NOTE ADULT - SUBJECTIVE AND OBJECTIVE BOX
B TEAM PROGRESS NOTE    Doing well. Tolerating clears.    Vital Signs Last 24 Hrs  T(C): 36.7 (04 Sep 2017 09:56), Max: 37.6 (03 Sep 2017 19:31)  T(F): 98.1 (04 Sep 2017 09:56), Max: 99.7 (03 Sep 2017 19:31)  HR: 93 (04 Sep 2017 09:56) (82 - 93)  BP: 127/92 (04 Sep 2017 09:56) (119/68 - 133/72)  BP(mean): --  RR: 18 (04 Sep 2017 09:56) (17 - 18)  SpO2: 97% (04 Sep 2017 09:56) (95% - 100%)                        13.2   11.50 )-----------( 189      ( 04 Sep 2017 07:00 )             39.1     09-03    141  |  104  |  12  ----------------------------<  143<H>  3.7   |  22  |  0.83    Ca    9.0      03 Sep 2017 05:54      Gen: NAD  Abd: Soft, mildly distended, appropriately tender. incisions c/d/i

## 2017-09-04 NOTE — PROGRESS NOTE ADULT - ASSESSMENT
62M with ruptured appendicitis s/p lap appy POD2  - Switch from IV zosyn to PO augmention  - Continue CLD. Possible advance to regular diet in the PM  - Likely d/c in the AM  - OOB

## 2017-09-04 NOTE — PROGRESS NOTE ADULT - ATTENDING COMMENTS
September 4th, 2017  8 PM    Hospital Day #2  Post op Day #2    This patient was seen and evaluated on daily B-Team rounds. Care discussed at B-Team morning report sign-out. Adjacent B-Team resident note reviewed.    BP		=	110 - 148/57 - 92  P		=	82 - 95		O2 Sat	=	95% - 100%  R		=	17 - 18		I/O	=	1,525 in/3,415 out  Temp		=	36.3 - 37.8			- 2.5 l since admission (not counting OR)    Glucose	=	-    Weight		=	85.7 Kg  BMI		=	28.7    Awake, alert, fully oriented. In no acute distress. Does not appear toxic.  Anicteric. Pupils reactive. Extra-occular movements intact.  No thrush.  No JVD.  Lungs with non-labored respirations. Symmetrical chest wall movements.  COR - RRR  Abdomen softly distended. Appropriately tender. Port sites clean.  Extremities well perfused. No rash.    WBC	=	12	Na		=	141  Neutro	=	-	K		=	3.7  Hgb	=	13	Cl		=	104  Hct	=	39%	HCO3		=	22  Plts	=	189	Glucose	=	143  			BUN		=	12  PT	=	-	Creat		=	0.8  PTT	=	-  INR	=	-    Urine 9/2	-	Negative    Remains on:    1)	Regular diet  2)	Augmentin 875/125 mg po q12 hours	-	Day #0 (after 2 days of Zosyn).  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 The pain was accompanied with urinary retention. He did not have fever or leukocytosis but he had diffuse abdominal tenderness that was most pronounced in the lower quadrants and was accompanied with guarding and rebound who had a leukocytosis and who was found on CT scan to have acute appendicitis. He subsequently underwent laparoscopy finding acute perforated appendicitis with purulent peritonitis and had an appendectomy. There are reports that he has used cocaine and therefore post-operatively he was placed on telemetry monitoring. He has improved. His WBC has decreased from 17 to 12 and his pain and tenderness are abating. He has had a low grade fever. Today the zosyn was changed to Augmentin and he was advanced to a regular diet. His mild sinus tachycardia has improved slightly and he has not had significant arrhythmias.    Plan to:    1)	Re-check his WBC.  2)	Discontinue the telemetry and transfer to the general surgical floor.  3)	Change the unfractionated heparin to enoxaparin.  4)	Change the Tylenol to Motrin.  5)	Allow and assist him to be out of bed. May shower.  6)	Give colace to prevent straining while having a bowel movement  7)	Clarify orders  8)	Full support in place    Bry Banda  20 Minutes exclusive of procedures September 4th, 2017  8 PM    Hospital Day #2  Post op Day #2    This patient was seen and evaluated on daily B-Team rounds. Care discussed at B-Team morning report sign-out. Adjacent B-Team resident note reviewed.    BP		=	110 - 148/57 - 92  P		=	82 - 95		O2 Sat	=	95% - 100%  R		=	17 - 18		I/O	=	1,525 in/3,415 out  Temp		=	36.3 - 37.8			- 2.5 l since admission (not counting OR)    Glucose	=	-    Weight		=	85.7 Kg  BMI		=	28.7    Awake, alert, fully oriented. In no acute distress. Does not appear toxic.  Anicteric. Pupils reactive. Extra-occular movements intact.  No thrush.  No JVD.  Lungs with non-labored respirations. Symmetrical chest wall movements.  COR - RRR  Abdomen softly distended. Appropriately tender. Port sites clean.  Extremities well perfused. No rash.    WBC	=	12	Na		=	141  Neutro	=	-	K		=	3.7  Hgb	=	13	Cl		=	104  Hct	=	39%	HCO3		=	22  Plts	=	189	Glucose	=	143  			BUN		=	12  PT	=	-	Creat		=	0.8  PTT	=	-  INR	=	-    Urine 9/2	-	Negative    Remains on:    1)	Regular diet  2)	Augmentin 875/125 mg po q12 hours	-	Day #0 (after 2 days of Zosyn).  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 The pain was accompanied with urinary retention. He did not have fever or leukocytosis but he had diffuse abdominal tenderness that was most pronounced in the lower quadrants and was accompanied with guarding and rebound who had a leukocytosis and who was found on CT scan to have acute appendicitis. He subsequently underwent laparoscopy finding acute perforated appendicitis with purulent peritonitis and had an appendectomy. There are reports that he has used cocaine and therefore post-operatively he was placed on telemetry monitoring. He has improved. His WBC has decreased from 17 to 12 and his pain and tenderness are abating. He has had a low grade fever. Today the zosyn was changed to Augmentin and he was advanced to a regular diet. His mild sinus tachycardia has improved slightly and he has not had significant arrhythmias.    Plan to:    1)	Re-check his WBC.  2)	Discontinue the telemetry and transfer to the general surgical floor.  3)	Change the unfractionated heparin to enoxaparin.  4)	Change the Tylenol to Motrin. Discontinue the morphine. May need narcotics (oxycodone)  	but will discuss with the patient.  5)	Allow and assist him to be out of bed. May shower.  6)	Resume Zocor  7)	Give colace to prevent straining while having a bowel movement  8)	Clarify orders  9)	Full support in place    Bry Banda  20 Minutes exclusive of procedures September 4th, 2017  8 PM    Hospital Day #2  Post op Day #2    This patient was seen and evaluated on daily B-Team rounds. Care discussed at B-Team morning report sign-out. Adjacent B-Team resident note reviewed.    BP		=	110 - 148/57 - 92  P		=	82 - 95		O2 Sat	=	95% - 100%  R		=	17 - 18		I/O	=	1,525 in/3,415 out  Temp		=	36.3 - 37.8			- 2.5 l since admission (not counting OR)    Glucose	=	-    Weight		=	85.7 Kg  BMI		=	28.7    Awake, alert, fully oriented. In no acute distress. Does not appear toxic.  Anicteric. Pupils reactive. Extra-occular movements intact.  No thrush.  No JVD.  Lungs with non-labored respirations. Symmetrical chest wall movements.  COR - RRR  Abdomen softly distended. Appropriately tender. Port sites clean.  Extremities well perfused. No rash.    WBC	=	12	Na		=	141  Neutro	=	-	K		=	3.7  Hgb	=	13	Cl		=	104  Hct	=	39%	HCO3		=	22  Plts	=	189	Glucose	=	143  			BUN		=	12  PT	=	-	Creat		=	0.8  PTT	=	-  INR	=	-    Urine 9/2	-	Negative    Remains on:    1)	Regular diet  2)	Augmentin 875/125 mg po q12 hours	-	Day #0 (after 2 days of Zosyn).  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 The pain was accompanied with urinary retention. He did not have fever or leukocytosis but he had diffuse abdominal tenderness that was most pronounced in the lower quadrants and was accompanied with guarding and rebound who had a leukocytosis and who was found on CT scan to have acute appendicitis. He subsequently underwent laparoscopy finding acute perforated appendicitis with purulent peritonitis and had an appendectomy. There are reports that he has used cocaine and therefore post-operatively he was placed on telemetry monitoring. He has improved. His WBC has decreased from 17 to 12 and his pain and tenderness are abating. He has had a low grade fever. Today the zosyn was changed to Augmentin and he was advanced to a regular diet. His mild sinus tachycardia has improved slightly and he has not had significant arrhythmias.    Plan to:    1)	Re-check his WBC.  2)	Discontinue the telemetry and transfer to the general surgical floor.  3)	Change the unfractionated heparin to enoxaparin.  4)	Change the Tylenol to Motrin. Discontinue the morphine. May need narcotics  	(oxycodone) but will discuss with the patient.  5)	Allow and assist him to be out of bed. May shower.  6)	Resume Zocor  7)	Give colace to prevent straining while having a bowel movement  8)	Clarify orders  9)	Full support in place    Bry Banda  20 Minutes exclusive of procedures

## 2017-09-05 ENCOUNTER — TRANSCRIPTION ENCOUNTER (OUTPATIENT)
Age: 62
End: 2017-09-05

## 2017-09-05 VITALS
RESPIRATION RATE: 16 BRPM | DIASTOLIC BLOOD PRESSURE: 76 MMHG | HEART RATE: 82 BPM | OXYGEN SATURATION: 98 % | TEMPERATURE: 97 F | SYSTOLIC BLOOD PRESSURE: 114 MMHG

## 2017-09-05 LAB
BUN SERPL-MCNC: 13 MG/DL — SIGNIFICANT CHANGE UP (ref 7–23)
CALCIUM SERPL-MCNC: 9.1 MG/DL — SIGNIFICANT CHANGE UP (ref 8.4–10.5)
CHLORIDE SERPL-SCNC: 98 MMOL/L — SIGNIFICANT CHANGE UP (ref 98–107)
CO2 SERPL-SCNC: 24 MMOL/L — SIGNIFICANT CHANGE UP (ref 22–31)
CREAT SERPL-MCNC: 0.9 MG/DL — SIGNIFICANT CHANGE UP (ref 0.5–1.3)
GLUCOSE SERPL-MCNC: 121 MG/DL — HIGH (ref 70–99)
HCT VFR BLD CALC: 44 % — SIGNIFICANT CHANGE UP (ref 39–50)
HGB BLD-MCNC: 14.5 G/DL — SIGNIFICANT CHANGE UP (ref 13–17)
MAGNESIUM SERPL-MCNC: 1.8 MG/DL — SIGNIFICANT CHANGE UP (ref 1.6–2.6)
MCHC RBC-ENTMCNC: 32.2 PG — SIGNIFICANT CHANGE UP (ref 27–34)
MCHC RBC-ENTMCNC: 33 % — SIGNIFICANT CHANGE UP (ref 32–36)
MCV RBC AUTO: 97.8 FL — SIGNIFICANT CHANGE UP (ref 80–100)
NRBC # FLD: 0 — SIGNIFICANT CHANGE UP
PLATELET # BLD AUTO: 207 K/UL — SIGNIFICANT CHANGE UP (ref 150–400)
PMV BLD: 9.9 FL — SIGNIFICANT CHANGE UP (ref 7–13)
POTASSIUM SERPL-MCNC: 3.6 MMOL/L — SIGNIFICANT CHANGE UP (ref 3.5–5.3)
POTASSIUM SERPL-SCNC: 3.6 MMOL/L — SIGNIFICANT CHANGE UP (ref 3.5–5.3)
RBC # BLD: 4.5 M/UL — SIGNIFICANT CHANGE UP (ref 4.2–5.8)
RBC # FLD: 12.7 % — SIGNIFICANT CHANGE UP (ref 10.3–14.5)
SODIUM SERPL-SCNC: 137 MMOL/L — SIGNIFICANT CHANGE UP (ref 135–145)
WBC # BLD: 9.56 K/UL — SIGNIFICANT CHANGE UP (ref 3.8–10.5)
WBC # FLD AUTO: 9.56 K/UL — SIGNIFICANT CHANGE UP (ref 3.8–10.5)

## 2017-09-05 RX ORDER — IBUPROFEN 200 MG
600 TABLET ORAL EVERY 6 HOURS
Qty: 0 | Refills: 0 | Status: DISCONTINUED | OUTPATIENT
Start: 2017-09-05 | End: 2017-09-05

## 2017-09-05 RX ORDER — ASPIRIN/CALCIUM CARB/MAGNESIUM 324 MG
1 TABLET ORAL
Qty: 0 | Refills: 0 | COMMUNITY
Start: 2017-09-05

## 2017-09-05 RX ORDER — DOCUSATE SODIUM 100 MG
100 CAPSULE ORAL THREE TIMES A DAY
Qty: 0 | Refills: 0 | Status: DISCONTINUED | OUTPATIENT
Start: 2017-09-05 | End: 2017-09-05

## 2017-09-05 RX ORDER — POTASSIUM CHLORIDE 20 MEQ
40 PACKET (EA) ORAL ONCE
Qty: 0 | Refills: 0 | Status: DISCONTINUED | OUTPATIENT
Start: 2017-09-05 | End: 2017-09-05

## 2017-09-05 RX ORDER — IBUPROFEN 200 MG
1 TABLET ORAL
Qty: 0 | Refills: 0 | COMMUNITY
Start: 2017-09-05

## 2017-09-05 RX ORDER — ENOXAPARIN SODIUM 100 MG/ML
40 INJECTION SUBCUTANEOUS
Qty: 0 | Refills: 0 | Status: DISCONTINUED | OUTPATIENT
Start: 2017-09-05 | End: 2017-09-05

## 2017-09-05 RX ORDER — ASPIRIN/CALCIUM CARB/MAGNESIUM 324 MG
81 TABLET ORAL
Qty: 0 | Refills: 0 | Status: DISCONTINUED | OUTPATIENT
Start: 2017-09-05 | End: 2017-09-05

## 2017-09-05 RX ORDER — SIMVASTATIN 20 MG/1
20 TABLET, FILM COATED ORAL AT BEDTIME
Qty: 0 | Refills: 0 | Status: DISCONTINUED | OUTPATIENT
Start: 2017-09-05 | End: 2017-09-05

## 2017-09-05 RX ORDER — MAGNESIUM SULFATE 500 MG/ML
2 VIAL (ML) INJECTION ONCE
Qty: 0 | Refills: 0 | Status: DISCONTINUED | OUTPATIENT
Start: 2017-09-05 | End: 2017-09-05

## 2017-09-05 RX ORDER — ZOLPIDEM TARTRATE 10 MG/1
10 TABLET ORAL AT BEDTIME
Qty: 0 | Refills: 0 | Status: DISCONTINUED | OUTPATIENT
Start: 2017-09-05 | End: 2017-09-05

## 2017-09-05 RX ADMIN — Medication 81 MILLIGRAM(S): at 13:15

## 2017-09-05 RX ADMIN — Medication 30 MILLIGRAM(S): at 01:06

## 2017-09-05 RX ADMIN — Medication 1 PATCH: at 06:57

## 2017-09-05 RX ADMIN — ZOLPIDEM TARTRATE 5 MILLIGRAM(S): 10 TABLET ORAL at 01:10

## 2017-09-05 RX ADMIN — Medication 600 MILLIGRAM(S): at 14:16

## 2017-09-05 RX ADMIN — Medication 600 MILLIGRAM(S): at 06:58

## 2017-09-05 RX ADMIN — Medication 1 PATCH: at 06:59

## 2017-09-05 RX ADMIN — Medication 30 MILLIGRAM(S): at 01:40

## 2017-09-05 RX ADMIN — Medication 1 TABLET(S): at 06:58

## 2017-09-05 RX ADMIN — Medication 600 MILLIGRAM(S): at 13:16

## 2017-09-05 RX ADMIN — Medication 600 MILLIGRAM(S): at 07:58

## 2017-09-05 NOTE — DISCHARGE NOTE ADULT - PLAN OF CARE
s/p laparoscopic appendectomy Please continue Augmentin twice daily for 7 days. Please call to make an appointment to follow up with your surgeon, Dr. Aparicio for post-op check up. You may take Tylenol or Ibuprofen as directed for pain as needed.

## 2017-09-05 NOTE — DISCHARGE NOTE ADULT - MEDICATION SUMMARY - MEDICATIONS TO TAKE
I will START or STAY ON the medications listed below when I get home from the hospital:    predniSONE  --  by mouth , As Needed for cluster headache episodes  -- Indication: For Cluster Headaches    SUMAtriptan  --  by mouth , As Needed for cluster headache episodes  -- Indication: For Cluster Headaches     mg oral tablet  -- 1 tab(s) by mouth every 6 hours  -- Indication: For pain    aspirin 81 mg oral tablet, chewable  -- 1 tab(s) by mouth   -- Indication: For CAD prevention    simvastatin 20 mg oral tablet  -- 1 tab(s) by mouth once a day (at bedtime)  -- Indication: For HLP    amoxicillin-clavulanate 875 mg-125 mg oral tablet  -- 1 tab(s) by mouth 2 times a day  -- Indication: For Perforated appendicitis

## 2017-09-05 NOTE — PROGRESS NOTE ADULT - ATTENDING COMMENTS
Seen and examined.  Chart and note reviewed    Postoperative day#3 s/p laparoscopic appendectomy for perforated appendicitis    S>  Patient without complaints.  He is tolerating a regular diet.  Notes loose bowel movements x 3 since yesterday.  He notes minimal incisional pain  O>  He is awake, alert, not in distress        Staples in place, incisional clean dry and intact  A>  Perforated appendicitis  P>  Continue Augmentin to complete 7 days         Diet as tolerated         Discontinue staples, change to steristrips+ Mastisol         Follow up with Dr. Banda in 1-2 weeks

## 2017-09-05 NOTE — DISCHARGE NOTE ADULT - PATIENT PORTAL LINK FT
“You can access the [facility] Patient Portal, offered by [facility], by registering with the following website: [email or web address]”

## 2017-09-05 NOTE — DISCHARGE NOTE ADULT - HOSPITAL COURSE
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 He reports some abdominal discomfort/bloating over the last 2 days, and acute onset of generalized pain at midnight 9/2/17 while preparing to board a flight to [place] from [facility]. He was brought to [facility] by ambulance. He denies associated fevers/chills, nausea/vomiting, dysuria. He has been having flatus and normal bowel movements without melena or hematochezia. Last colonoscopy in 2013 was reportedly normal. He had a stress test in 2010 for angina that reportedly showed some coronary lesions but did not required any revascularization procedures. CT showed findings suspicious for appendicitis. Small amount of abdominal ascites. No obstructive urolithiasis. Patient underwent a laparoscopic appendectomy on 9/2/17. Patient was started on Zosyn IV.    Patient reports improved abdominal pain, his diet has been advanced. Patient is tolerating a regular diet, started on PO antibiotics and is ambulating without difficulty. Patient has been instructed to follow up with his surgeon for post-op check up and continue Augmentin 7 day course as directed. Patient is stable for discharge home.

## 2017-09-05 NOTE — DISCHARGE NOTE ADULT - CARE PROVIDER_API CALL
Karissa Aparicio), DieticianNutrition; Surgery; Surgical Critical Care  [address]  [address] [address]  Phone: [phone]  Fax: [phone]

## 2017-09-05 NOTE — DISCHARGE NOTE ADULT - CARE PLAN
Principal Discharge DX:	Acute appendicitis with generalized peritonitis  Goal:	s/p laparoscopic appendectomy  Instructions for follow-up, activity and diet:	Please continue Augmentin twice daily for 7 days. Please call to make an appointment to follow up with your surgeon, Dr. Aparicio for post-op check up. You may take Tylenol or Ibuprofen as directed for pain as needed.

## 2017-09-06 LAB — SURGICAL PATHOLOGY STUDY: SIGNIFICANT CHANGE UP

## 2017-09-06 RX ORDER — SUMATRIPTAN SUCCINATE 4 MG/.5ML
0 INJECTION, SOLUTION SUBCUTANEOUS
Qty: 0 | Refills: 0 | COMMUNITY

## 2017-09-06 RX ORDER — SIMVASTATIN 20 MG/1
1 TABLET, FILM COATED ORAL
Qty: 0 | Refills: 0 | COMMUNITY

## 2017-09-18 ENCOUNTER — APPOINTMENT (OUTPATIENT)
Dept: TRAUMA SURGERY | Facility: CLINIC | Age: 62
End: 2017-09-18
Payer: COMMERCIAL

## 2017-09-18 VITALS
SYSTOLIC BLOOD PRESSURE: 108 MMHG | WEIGHT: 170 LBS | TEMPERATURE: 97.8 F | DIASTOLIC BLOOD PRESSURE: 75 MMHG | HEIGHT: 69 IN | BODY MASS INDEX: 25.18 KG/M2 | HEART RATE: 67 BPM

## 2017-09-18 PROCEDURE — 99024 POSTOP FOLLOW-UP VISIT: CPT

## 2018-07-05 NOTE — ED ADULT NURSE NOTE - PT NEEDS ASSIST
Transitional Care Navigator:    Chart reviewed for post acute needs.  Pt is a 80 yom whole lives alone locally. His LACE+ is 76, with mobility limited to 3 feet. Pt has required assistance with ambulation including the use of a FWW, and has been noted to tire easily.    With current condition, this patient is an appropriate candidate for inpatient skilled therapies; requesting PT/OT evaluations.  He has been to [facility] previously.    Please consider an order for referral to SNF.   no

## 2022-03-28 NOTE — ED ADULT NURSE NOTE - CAS EDN DISCHARGE INTERVENTIONS
[FreeTextEntry1] : 52 year old male with:\par 1)  Gout:  pt now willing to start allopurinol.\par   - Start allopurinol 100mg daily, and will titratre up to goal of uric acid < 6.  Start colchicine 0.6mg daily.\par   - Previously discussed gout dietary recommendations.\par   - Encouraged pt to "keep up the good work" regarding diet, exercise.\par 2)  OA of right knee, w/ medial and lateral meniscal tears s/p prior surgery.  Pt reports that he has been pain-free though the knee becomes displaced at times.\par   - Reiterated importance of knee exercises\par   - Knee brace, frank w/ running.\par   - Orthopedics f/u.\par \par Pt has received the first dose of the COVID19 vaccine.
Arm band on/IV intact

## 2023-02-06 ENCOUNTER — OFFICE (OUTPATIENT)
Dept: URBAN - METROPOLITAN AREA CLINIC 30 | Facility: CLINIC | Age: 68
Setting detail: OPHTHALMOLOGY
End: 2023-02-06
Payer: MEDICARE

## 2023-02-06 DIAGNOSIS — H21.233: ICD-10-CM

## 2023-02-06 DIAGNOSIS — E11.9: ICD-10-CM

## 2023-02-06 DIAGNOSIS — H52.4: ICD-10-CM

## 2023-02-06 DIAGNOSIS — H25.13: ICD-10-CM

## 2023-02-06 DIAGNOSIS — H40.1313: ICD-10-CM

## 2023-02-06 PROCEDURE — 92015 DETERMINE REFRACTIVE STATE: CPT | Performed by: OPHTHALMOLOGY

## 2023-02-06 PROCEDURE — 92133 CPTRZD OPH DX IMG PST SGM ON: CPT | Performed by: OPHTHALMOLOGY

## 2023-02-06 PROCEDURE — 92083 EXTENDED VISUAL FIELD XM: CPT | Performed by: OPHTHALMOLOGY

## 2023-02-06 PROCEDURE — 92014 COMPRE OPH EXAM EST PT 1/>: CPT | Performed by: OPHTHALMOLOGY

## 2023-02-06 ASSESSMENT — REFRACTION_AUTOREFRACTION
OS_AXIS: 70
OS_CYLINDER: +1.25
OS_SPHERE: -2.50

## 2023-02-06 ASSESSMENT — REFRACTION_MANIFEST
OS_AXIS: 080
OD_VA1: NLP
OS_VA1: 20/40+
OS_SPHERE: -2.50
OS_CYLINDER: +1.00
OS_ADD: +2.50
OD_SPHERE: BALANCE

## 2023-02-06 ASSESSMENT — REFRACTION_CURRENTRX
OS_SPHERE: -2.25
OS_OVR_VA: 20/
OD_OVR_VA: 20/
OS_CYLINDER: +0.25
OD_CYLINDER: +0.50
OD_SPHERE: -2.00
OD_AXIS: 95
OS_AXIS: 90

## 2023-02-06 ASSESSMENT — CONFRONTATIONAL VISUAL FIELD TEST (CVF)
OS_FINDINGS: FULL
OD_FINDINGS: FULL

## 2023-02-06 ASSESSMENT — SPHEQUIV_DERIVED
OS_SPHEQUIV: -2
OS_SPHEQUIV: -1.875

## 2023-02-06 ASSESSMENT — VISUAL ACUITY: OD_BCVA: 20/40

## 2023-02-06 ASSESSMENT — TONOMETRY: OS_IOP_MMHG: 18

## 2025-07-07 ENCOUNTER — OFFICE (OUTPATIENT)
Facility: LOCATION | Age: 70
Setting detail: OPHTHALMOLOGY
End: 2025-07-07
Payer: MEDICARE

## 2025-07-07 DIAGNOSIS — E11.9: ICD-10-CM

## 2025-07-07 DIAGNOSIS — H52.4: ICD-10-CM

## 2025-07-07 DIAGNOSIS — H21.233: ICD-10-CM

## 2025-07-07 DIAGNOSIS — H40.1313: ICD-10-CM

## 2025-07-07 DIAGNOSIS — H25.13: ICD-10-CM

## 2025-07-07 PROCEDURE — 92020 GONIOSCOPY: CPT | Performed by: OPHTHALMOLOGY

## 2025-07-07 PROCEDURE — 92015 DETERMINE REFRACTIVE STATE: CPT | Performed by: OPHTHALMOLOGY

## 2025-07-07 PROCEDURE — 92014 COMPRE OPH EXAM EST PT 1/>: CPT | Performed by: OPHTHALMOLOGY

## 2025-07-07 PROCEDURE — 92250 FUNDUS PHOTOGRAPHY W/I&R: CPT | Performed by: OPHTHALMOLOGY

## 2025-07-07 ASSESSMENT — CONFRONTATIONAL VISUAL FIELD TEST (CVF)
OS_FINDINGS: FULL
OD_FINDINGS: FULL

## 2025-07-07 ASSESSMENT — REFRACTION_AUTOREFRACTION
OS_AXIS: 069
OS_SPHERE: -1.75
OS_CYLINDER: +0.50

## 2025-07-07 ASSESSMENT — REFRACTION_CURRENTRX
OS_AXIS: 90
OS_SPHERE: -2.25
OS_CYLINDER: +0.25
OS_OVR_VA: 20/
OD_CYLINDER: +0.50
OD_AXIS: 95
OD_OVR_VA: 20/
OD_SPHERE: -2.00

## 2025-07-07 ASSESSMENT — VISUAL ACUITY
OD_BCVA: 20/60-1
OS_BCVA: NLP

## 2025-07-07 ASSESSMENT — REFRACTION_MANIFEST
OD_SPHERE: BALANCE
OS_CYLINDER: +0.50
OS_VA1: 20/30+
OS_SPHERE: -2.00
OD_VA1: NLP
OS_ADD: +2.50
OS_AXIS: 075

## 2025-07-07 ASSESSMENT — TONOMETRY: OS_IOP_MMHG: 17
